# Patient Record
Sex: MALE | URBAN - METROPOLITAN AREA
[De-identification: names, ages, dates, MRNs, and addresses within clinical notes are randomized per-mention and may not be internally consistent; named-entity substitution may affect disease eponyms.]

---

## 2020-12-18 ENCOUNTER — OFFICE VISIT (OUTPATIENT)
Dept: URBAN - METROPOLITAN AREA CLINIC 13 | Facility: CLINIC | Age: 75
End: 2020-12-18
Payer: MEDICARE

## 2020-12-18 PROCEDURE — 76512 OPH US DX B-SCAN: CPT | Performed by: OPHTHALMOLOGY

## 2020-12-18 PROCEDURE — 92235 FLUORESCEIN ANGRPH MLTIFRAME: CPT | Performed by: OPHTHALMOLOGY

## 2020-12-18 PROCEDURE — 99204 OFFICE O/P NEW MOD 45 MIN: CPT | Performed by: OPHTHALMOLOGY

## 2020-12-18 PROCEDURE — 92134 CPTRZ OPH DX IMG PST SGM RTA: CPT | Performed by: OPHTHALMOLOGY

## 2020-12-18 ASSESSMENT — INTRAOCULAR PRESSURE
OD: 17
OS: 17

## 2020-12-18 NOTE — IMPRESSION/PLAN
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
-history of glaucoma drops; no glaucoma surgery
-not using any more -no recent surgery or trauma
-on Plavix and Eliquis
-symptoms x 6 months OCT:
OD: WNL
OS: CR Folds FA: 
late temporal leakage OS; no disc leakage or evidence of retinal vasculitis B-scan OS: 
peripheral choroidal detachment with mixed echogenicity most likely consistent with heme Plan: Most likely consistent with hemorrhagic choroidal detachment, however, time course is strange. Given chronicity, I would recommend evaluation with Ocular Oncology to rule out any choroidal mass such as ring melanoma.  

RTC 2 months unless pt is being followed by Ocular Oncology

## 2021-02-15 ENCOUNTER — OFFICE VISIT (OUTPATIENT)
Dept: URBAN - METROPOLITAN AREA CLINIC 13 | Facility: CLINIC | Age: 76
End: 2021-02-15
Payer: OTHER MISCELLANEOUS

## 2021-02-15 PROCEDURE — 92134 CPTRZ OPH DX IMG PST SGM RTA: CPT | Performed by: OPHTHALMOLOGY

## 2021-02-15 PROCEDURE — 99213 OFFICE O/P EST LOW 20 MIN: CPT | Performed by: OPHTHALMOLOGY

## 2021-02-15 ASSESSMENT — INTRAOCULAR PRESSURE
OS: 14
OD: 14

## 2021-02-15 NOTE — IMPRESSION/PLAN
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
-history of glaucoma drops; no glaucoma surgery
-not using any more -no recent surgery or trauma
-on Plavix and Eliquis
-symptoms x 6 months OCT: 02/15/21 OD: WNL
OS: CR Folds FA: 12/18/20 
late temporal leakage OS; no disc leakage or evidence of retinal vasculitis B-scan OS: 
peripheral choroidal detachment with mixed echogenicity most likely consistent with heme Plan: Most likely consistent with hemorrhagic choroidal detachment. On blood thinners. Saw Dr Olive Monte, no evidence of melanoma. Observe RTC 3 months DFE OU OCT OU Optos Colors OU

## 2021-05-24 ENCOUNTER — OFFICE VISIT (OUTPATIENT)
Dept: URBAN - METROPOLITAN AREA CLINIC 13 | Facility: CLINIC | Age: 76
End: 2021-05-24
Payer: MEDICARE

## 2021-05-24 DIAGNOSIS — H31.412 HEMORRHAGIC CHOROIDAL DETACHMENT, LEFT EYE: Primary | ICD-10-CM

## 2021-05-24 PROCEDURE — 92134 CPTRZ OPH DX IMG PST SGM RTA: CPT | Performed by: OPHTHALMOLOGY

## 2021-05-24 PROCEDURE — 76512 OPH US DX B-SCAN: CPT | Performed by: OPHTHALMOLOGY

## 2021-05-24 PROCEDURE — 99213 OFFICE O/P EST LOW 20 MIN: CPT | Performed by: OPHTHALMOLOGY

## 2021-05-24 ASSESSMENT — INTRAOCULAR PRESSURE
OD: 14
OS: 13

## 2021-05-24 NOTE — IMPRESSION/PLAN
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
-history of glaucoma drops; no glaucoma surgery
-not using any more -no recent surgery or trauma
-on Plavix and Eliquis OCT: 05/24/21 OD: WNL
OS: CR Folds FA: 12/18/20 
late temporal leakage OS; no disc leakage or evidence of retinal vasculitis B-scan OS: 
peripheral choroidal detachment with mixed echogenicity most likely consistent with heme Plan: Most likely consistent with hemorrhagic choroidal detachment. On blood thinners. Saw Dr Paul Jackman, no evidence of melanoma. Observe RTC 6 months DFE OU OCT OU Optos Colors OU